# Patient Record
Sex: FEMALE | Race: BLACK OR AFRICAN AMERICAN | NOT HISPANIC OR LATINO | Employment: FULL TIME | URBAN - METROPOLITAN AREA
[De-identification: names, ages, dates, MRNs, and addresses within clinical notes are randomized per-mention and may not be internally consistent; named-entity substitution may affect disease eponyms.]

---

## 2017-04-28 ENCOUNTER — HOSPITAL ENCOUNTER (OUTPATIENT)
Dept: OTHER | Facility: HOSPITAL | Age: 52
Discharge: HOME | End: 2017-04-28

## 2018-10-26 ENCOUNTER — CONVERSION ENCOUNTER (OUTPATIENT)
Dept: INTERNAL MEDICINE | Facility: CLINIC | Age: 53
End: 2018-10-26

## 2018-10-26 ENCOUNTER — OFFICE VISIT CONVERTED (OUTPATIENT)
Dept: INTERNAL MEDICINE | Facility: CLINIC | Age: 53
End: 2018-10-26
Attending: INTERNAL MEDICINE

## 2018-11-20 ENCOUNTER — HOSPITAL ENCOUNTER (OUTPATIENT)
Dept: OTHER | Facility: HOSPITAL | Age: 53
Discharge: HOME | End: 2018-11-20

## 2018-12-27 ENCOUNTER — OFFICE VISIT CONVERTED (OUTPATIENT)
Dept: INTERNAL MEDICINE | Facility: CLINIC | Age: 53
End: 2018-12-27
Attending: INTERNAL MEDICINE

## 2019-11-01 ENCOUNTER — HOSPITAL ENCOUNTER (OUTPATIENT)
Dept: OTHER | Facility: HOSPITAL | Age: 54
Discharge: HOME | End: 2019-11-01
Attending: INTERNAL MEDICINE

## 2019-11-01 ENCOUNTER — OFFICE VISIT CONVERTED (OUTPATIENT)
Dept: INTERNAL MEDICINE | Facility: CLINIC | Age: 54
End: 2019-11-01
Attending: INTERNAL MEDICINE

## 2019-11-01 LAB
25(OH)D3 SERPL-MCNC: 11.3 NG/ML (ref 30–100)
ALBUMIN SERPL-MCNC: 4.6 G/DL (ref 3.5–5)
ALBUMIN/GLOB SERPL: 1.6 {RATIO} (ref 1.4–2.6)
ALP SERPL-CCNC: 131 U/L (ref 53–141)
ALT SERPL-CCNC: 11 U/L (ref 10–40)
ANION GAP SERPL CALC-SCNC: 16 MMOL/L (ref 8–19)
AST SERPL-CCNC: 20 U/L (ref 15–50)
BASOPHILS # BLD AUTO: 0.04 10*3/UL (ref 0–0.2)
BASOPHILS NFR BLD AUTO: 0.7 % (ref 0–3)
BILIRUB SERPL-MCNC: 0.31 MG/DL (ref 0.2–1.3)
BUN SERPL-MCNC: 16 MG/DL (ref 5–25)
BUN/CREAT SERPL: 18 {RATIO} (ref 6–20)
CALCIUM SERPL-MCNC: 10 MG/DL (ref 8.7–10.4)
CHLORIDE SERPL-SCNC: 103 MMOL/L (ref 99–111)
CHOLEST SERPL-MCNC: 150 MG/DL (ref 107–200)
CHOLEST/HDLC SERPL: 2 {RATIO} (ref 3–6)
CONV ABS IMM GRAN: 0.01 10*3/UL (ref 0–0.2)
CONV CO2: 27 MMOL/L (ref 22–32)
CONV IMMATURE GRAN: 0.2 % (ref 0–1.8)
CONV TOTAL PROTEIN: 7.5 G/DL (ref 6.3–8.2)
CREAT UR-MCNC: 0.88 MG/DL (ref 0.5–0.9)
DEPRECATED RDW RBC AUTO: 42.5 FL (ref 36.4–46.3)
EOSINOPHIL # BLD AUTO: 0.45 10*3/UL (ref 0–0.7)
EOSINOPHIL # BLD AUTO: 8.1 % (ref 0–7)
ERYTHROCYTE [DISTWIDTH] IN BLOOD BY AUTOMATED COUNT: 12.9 % (ref 11.7–14.4)
GFR SERPLBLD BASED ON 1.73 SQ M-ARVRAT: >60 ML/MIN/{1.73_M2}
GLOBULIN UR ELPH-MCNC: 2.9 G/DL (ref 2–3.5)
GLUCOSE SERPL-MCNC: 93 MG/DL (ref 65–99)
HCT VFR BLD AUTO: 41.1 % (ref 37–47)
HDLC SERPL-MCNC: 75 MG/DL (ref 40–60)
HGB BLD-MCNC: 13.1 G/DL (ref 12–16)
LDLC SERPL CALC-MCNC: 63 MG/DL (ref 70–100)
LYMPHOCYTES # BLD AUTO: 1.52 10*3/UL (ref 1–5)
LYMPHOCYTES NFR BLD AUTO: 27.4 % (ref 20–45)
MCH RBC QN AUTO: 28.9 PG (ref 27–31)
MCHC RBC AUTO-ENTMCNC: 31.9 G/DL (ref 33–37)
MCV RBC AUTO: 90.5 FL (ref 81–99)
MONOCYTES # BLD AUTO: 0.59 10*3/UL (ref 0.2–1.2)
MONOCYTES NFR BLD AUTO: 10.6 % (ref 3–10)
NEUTROPHILS # BLD AUTO: 2.93 10*3/UL (ref 2–8)
NEUTROPHILS NFR BLD AUTO: 53 % (ref 30–85)
NRBC CBCN: 0 % (ref 0–0.7)
OSMOLALITY SERPL CALC.SUM OF ELEC: 295 MOSM/KG (ref 273–304)
PLATELET # BLD AUTO: 272 10*3/UL (ref 130–400)
PMV BLD AUTO: 11.8 FL (ref 9.4–12.3)
POTASSIUM SERPL-SCNC: 4.1 MMOL/L (ref 3.5–5.3)
RBC # BLD AUTO: 4.54 10*6/UL (ref 4.2–5.4)
SODIUM SERPL-SCNC: 142 MMOL/L (ref 135–147)
TRIGL SERPL-MCNC: 62 MG/DL (ref 40–150)
TSH SERPL-ACNC: 1.41 M[IU]/L (ref 0.27–4.2)
VLDLC SERPL-MCNC: 12 MG/DL (ref 5–37)
WBC # BLD AUTO: 5.54 10*3/UL (ref 4.8–10.8)

## 2019-12-12 ENCOUNTER — HOSPITAL ENCOUNTER (OUTPATIENT)
Dept: OTHER | Facility: HOSPITAL | Age: 54
Discharge: HOME | End: 2019-12-12

## 2020-11-19 ENCOUNTER — OFFICE VISIT CONVERTED (OUTPATIENT)
Dept: INTERNAL MEDICINE | Facility: CLINIC | Age: 55
End: 2020-11-19
Attending: INTERNAL MEDICINE

## 2020-11-19 ENCOUNTER — HOSPITAL ENCOUNTER (OUTPATIENT)
Dept: OTHER | Facility: HOSPITAL | Age: 55
Discharge: HOME | End: 2020-11-19
Attending: INTERNAL MEDICINE

## 2020-12-03 ENCOUNTER — OFFICE VISIT CONVERTED (OUTPATIENT)
Dept: INTERNAL MEDICINE | Facility: CLINIC | Age: 55
End: 2020-12-03
Attending: INTERNAL MEDICINE

## 2021-05-13 NOTE — PROGRESS NOTES
Progress Note      Patient Name: Khoi Mackey   Patient ID: [id number]   Sex: Female   YOB: 1965    Primary Care Provider: Christiane Yeh MD    Visit Date: December 3, 2020    Provider: Christiane Yeh MD   Location: [facility]   Location Address: [address] [place] [address]  [place], [place]  [id number]   Location Phone: [phone]          Chief Complaint  · right shoulder pain       History Of Present Illness  Khoi Mackey is a 55 year old /Black female who presents for evaluation and treatment of:      Right shoulder pain, ongoing  Xray results reviewed, showing degenerative changes of glenohumeral and AC joints  Patient desires shoulder injection       Past Medical History  Disease Name Date Onset Notes   Allergic rhinitis --  --    Asthma --  --    Screening for breast cancer 12/2019 wnl         Past Surgical History  Procedure Name Date Notes   Partial Hysterectomy --  --    Pilonidal Cyst Excision --  --          Medication List  Name Date Started Instructions   fexofenadine 180 mg oral tablet  take 1 tablet (180 mg) by oral route once daily   Vitamin D2 50,000 unit oral capsule 11/07/2019 take 1 capsule (50,000 unit) by oral route once weekly         Allergy List  Allergen Name Date Reaction Notes   NO KNOWN DRUG ALLERGIES --  --  --        Allergies Reconciled  Social History  Finding Status Start/Stop Quantity Notes   Alcohol Current some day --/-- --  --    Tobacco Never --/-- --  --          Immunizations  NameDate Admin Mfg Trade Name Lot Number Route Inj VIS Given VIS Publication   Rymkirfyy17/19/2020 [facility] Fluzone Quadrivalent [id number] IM LD 11/19/2020 08/15/2019   Comments: pt tolerated well. Left office in stable condition.         Review of Systems  · Constitutional  o Denies  o : fever, fatigue, weight loss, weight gain  · Cardiovascular  o Denies  o : lower extremity edema, claudication, chest pressure,  "palpitations  · Respiratory  o Denies  o : shortness of breath, wheezing, frequent cough, hemoptysis, dyspnea on exertion  · Gastrointestinal  o Denies  o : nausea, vomiting, diarrhea, constipation, abdominal pain  · Musculoskeletal  o Admits  o : shoulder pain      Vitals  Date Time BP Position Site L\R Cuff Size HR RR TEMP (F) WT  HT  BMI kg/m2 BSA m2 O2 Sat FR L/min FiO2 HC       11/01/2019 10:51 /58 Sitting    63 - R 16 97.7 152lbs 8oz 5'  7\" 23.88 1.81 99 %  21%    11/19/2020 08:50 /68 Sitting    65 - R 15 96.8 160lbs 0oz 5'  7\" 25.06 1.85 98 %      12/03/2020 08:16 /64 Sitting    64 - R 15 97.6 161lbs 0oz 5'  7\" 25.22 1.86 100 %            Physical Examination  · Constitutional  o Appearance  o : no acute distress, well-nourished  · Head and Face  o Head  o :   § Inspection  § : atraumatic, normocephalic  · Eyes  o Eyes  o : extraocular movements intact, no scleral icterus, no conjunctival injection  · Ears, Nose, Mouth and Throat  o Ears  o :   § External Ears  § : normal  o Nose  o :   § Intranasal Exam  § : nares patent  o Oral Cavity  o :   § Oral Mucosa  § : moist mucous membranes  · Respiratory  o Respiratory Effort  o : breathing comfortably, symmetric chest rise  o Auscultation of Lungs  o : clear to asculatation bilaterally, no wheezes, rales, or rhonchii  · Cardiovascular  o Heart  o :   § Auscultation of Heart  § : regular rate and rhythm, no murmurs, rubs, or gallops  o Peripheral Vascular System  o :   § Extremities  § : no edema  · Neurologic  o Mental Status Examination  o :   § Orientation  § : grossly oriented to person, place and time  o Gait and Station  o :   § Gait Screening  § : normal gait  · Psychiatric  o General  o : normal mood and affect  · IMPShoulder Injection  o Shoulder  o : Right Shoulder   o Procedure info  o : Informed consent obtained. Patient was informed of possible adverse effects including but not limited to bleeding, damage to nerve, tendon or artery, " increased blood sugar, or increased blood pressure. They were instructed to call if they have any concerns or questions. Time out performed. Patient placed with shoulder passively hanging from side of body at 0 degrees. Lateral edge of scapular spine was palpated and site was marked just inferior to this, to proceed with injection per typical posterior approach. Betadine was swabbed at injection site per typical technique. 25 ga. 1.5 inch needle injected into bursa, directed slightly medially, aspiration was performed and then 80mg Kenalog and 4 mL of 1% Lidocaine without Epi was slowly injected into joint space, fluid was free flowing. Needle was removed, betadine wiped off and band-aid applied to injection site.           Assessment  · Right shoulder pain     719.41/M25.511  shoulder injection performed  patient tolerated well      Plan  · Orders  o Joint Injection; major joint or bursa (eg. shoulder, hip, knee, subacromial bursa) (60201) - 719.41/M25.511 - 12/03/2020  o ACO-39: Current medications updated and reviewed (, 1159F) - - 12/03/2020  o ACO-14: Influenza immunization administered or previously received [facility] () - - 12/03/2020  o 4.00 - Kenalog Injection 40mg (-5) - 719.41/M25.511 - 12/03/2020   Injection - Kenalog 40 mg; Dose: 1 ml; Site: Right Shoulder; Route: intra-articular; Date: 12/03/2020 08:53:08; Exp: 10/31/2021; Lot: [id number]; Mfg: [employer]; TradeName: triamcinolone acetonide; Location: [facility]; Administered By: Maris Angel MA; Comment: patient tolerated well, left office in stable condition  o 4.00 - Kenalog Injection 40mg (-6) - 719.41/M25.511 - 12/03/2020   Injection - Kenalog 40 mg; Dose: 1 ml; Site: Right Shoulder; Route: intra-articular; Date: 12/03/2020 08:54:09; Exp: 10/31/2021; Lot: [id number]; Mfg: TEVA PHARM; TradeName: triamcinolone acetonide; Location: [facility]; Administered By: Maris Angel MA;  Comment: patient tolerated well, left office in stable condition  · Medications  o Medications have been Reconciled  o Transition of Care or Provider Policy  · Instructions  o Patient was educated/instructed on their diagnosis, treatment and medications prior to discharge from the clinic today.  o Call the office with any concerns or questions.            Electronically Signed by: Christiane Yeh MD -Author on December 3, 2020 09:00:01 AM

## 2021-05-13 NOTE — PROGRESS NOTES
Progress Note      Patient Name: Khoi Mackey   Patient ID: [id number]   Sex: Female   YOB: 1965    Primary Care Provider: Christiane Yeh MD    Visit Date: November 19, 2020    Provider: Christiane Yeh MD   Location: [facility]   Location Address: [address] [place] [address] 3  [place], [place]  [id number]   Location Phone: [phone]          Chief Complaint  · right shoulder pain      History Of Present Illness  Khoi Mackey is a 55 year old /Black female who presents for evaluation and treatment of:      right shoulder pain x 11 months. pain in anterior shoulder, feels grinding sensation, worse with lifting arm to reach for things or bringing arm back behind her    Has been sleeping with arm elevated or on ice pack    wants flu shot today       Past Medical History  Disease Name Date Onset Notes   Allergic rhinitis --  --    Asthma --  --    Screening for breast cancer 12/2019 wnl         Past Surgical History  Procedure Name Date Notes   Partial Hysterectomy --  --    Pilonidal Cyst Excision --  --          Medication List  Name Date Started Instructions   fexofenadine 180 mg oral tablet  take 1 tablet (180 mg) by oral route once daily   naproxen 500 mg oral tablet  take 1 tablet (500 mg) by oral route 2 times per day with food   olopatadine 0.1 % ophthalmic (eye) drops  --    Vitamin D2 50,000 unit oral capsule 11/07/2019 take 1 capsule (50,000 unit) by oral route once weekly         Allergy List  Allergen Name Date Reaction Notes   NO KNOWN DRUG ALLERGIES --  --  --        Allergies Reconciled  Social History  Finding Status Start/Stop Quantity Notes   Alcohol Current some day --/-- --  --    Tobacco Never --/-- --  --          Immunizations  NameDate Admin Mfg Trade Name Lot Number Route Inj VIS Given VIS Publication   Tsqiwjqil77/19/2020 [facility] Fluzone Quadrivalent [id number] IM LD 11/19/2020 08/15/2019   Comments: pt tolerated well. Left office in stable  "condition.         Review of Systems  · Constitutional  o Denies  o : fever, fatigue, weight loss, weight gain  · Cardiovascular  o Denies  o : lower extremity edema, claudication, chest pressure, palpitations  · Respiratory  o Denies  o : shortness of breath, wheezing, frequent cough, hemoptysis, dyspnea on exertion  · Gastrointestinal  o Denies  o : nausea, vomiting, diarrhea, constipation, abdominal pain  · Musculoskeletal  o Admits  o : shoulder pain      Vitals  Date Time BP Position Site L\R Cuff Size HR RR TEMP (F) WT  HT  BMI kg/m2 BSA m2 O2 Sat FR L/min FiO2 HC       12/27/2018 08:13 /64 Sitting    55 - R 12 98.7 156lbs 16oz 5'  7\" 24.59 1.83 100 %      11/01/2019 10:51 /58 Sitting    63 - R 16 97.7 152lbs 8oz 5'  7\" 23.88 1.81 99 %  21%    11/19/2020 08:50 /68 Sitting    65 - R 15 96.8 160lbs 0oz 5'  7\" 25.06 1.85 98 %            Physical Examination  · Constitutional  o Appearance  o : no acute distress, well-nourished  · Head and Face  o Head  o :   § Inspection  § : atraumatic, normocephalic  · Eyes  o Eyes  o : extraocular movements intact, no scleral icterus, no conjunctival injection  · Ears, Nose, Mouth and Throat  o Ears  o :   § External Ears  § : normal  o Nose  o :   § Intranasal Exam  § : nares patent  o Oral Cavity  o :   § Oral Mucosa  § : moist mucous membranes  · Respiratory  o Respiratory Effort  o : breathing comfortably, symmetric chest rise  o Auscultation of Lungs  o : clear to asculatation bilaterally, no wheezes, rales, or rhonchii  · Cardiovascular  o Heart  o :   § Auscultation of Heart  § : regular rate and rhythm, no murmurs, rubs, or gallops  o Peripheral Vascular System  o :   § Extremities  § : no edema  · Neurologic  o Mental Status Examination  o :   § Orientation  § : grossly oriented to person, place and time  o Gait and Station  o :   § Gait Screening  § : normal gait  · Psychiatric  o General  o : normal mood and affect          Assessment  · Need for " influenza vaccination     V04.81/Z23  · Right shoulder pain     719.41/M25.511  will obtain xray of shoulder      Plan  · Orders  o Immunization Admin Fee (Single) ([facility]) (37331) - V04.81/Z23 - 11/19/2020  o Fluzone Quadrivalent Vaccine, age 6 months + (32516) - V04.81/Z23 - 11/19/2020   Vaccine - Influenza; Dose: 0.5; Site: Left Deltoid; Route: Intramuscular; Date: 11/19/2020 09:08:00; Exp: 06/30/2020; Lot: [id number]; Mfg: sanofi pasteur; TradeName: Fluzone Quadrivalent; Administered By: Lizz Ashford LPN; Comment: pt tolerated well. Left office in stable condition.  o ACO-39: Current medications updated and reviewed (, 1159F) - - 11/19/2020  o Xray shoulder right [facility] Preferred View (60392-RM) - 719.41/M25.511 - 11/19/2020  · Medications  o Medications have been Reconciled  o Transition of Care or Provider Policy  · Instructions  o Patient was educated/instructed on their diagnosis, treatment and medications prior to discharge from the clinic today.  o Call the office with any concerns or questions.            Electronically Signed by: Christiane Yeh MD -Author on November 19, 2020 10:09:18 AM

## 2021-05-14 VITALS
HEIGHT: 67 IN | HEART RATE: 64 BPM | BODY MASS INDEX: 25.27 KG/M2 | WEIGHT: 161 LBS | DIASTOLIC BLOOD PRESSURE: 64 MMHG | TEMPERATURE: 97.6 F | OXYGEN SATURATION: 100 % | RESPIRATION RATE: 15 BRPM | SYSTOLIC BLOOD PRESSURE: 100 MMHG

## 2021-05-14 VITALS
SYSTOLIC BLOOD PRESSURE: 110 MMHG | OXYGEN SATURATION: 98 % | BODY MASS INDEX: 25.11 KG/M2 | HEIGHT: 67 IN | WEIGHT: 160 LBS | DIASTOLIC BLOOD PRESSURE: 68 MMHG | RESPIRATION RATE: 15 BRPM | HEART RATE: 65 BPM | TEMPERATURE: 96.8 F

## 2021-05-15 VITALS
OXYGEN SATURATION: 100 % | WEIGHT: 157 LBS | DIASTOLIC BLOOD PRESSURE: 64 MMHG | HEIGHT: 67 IN | TEMPERATURE: 98.7 F | BODY MASS INDEX: 24.64 KG/M2 | RESPIRATION RATE: 12 BRPM | SYSTOLIC BLOOD PRESSURE: 118 MMHG | HEART RATE: 56 BPM

## 2021-05-15 VITALS
TEMPERATURE: 97.7 F | WEIGHT: 152.5 LBS | HEIGHT: 67 IN | RESPIRATION RATE: 16 BRPM | HEART RATE: 63 BPM | BODY MASS INDEX: 23.93 KG/M2 | DIASTOLIC BLOOD PRESSURE: 58 MMHG | OXYGEN SATURATION: 99 % | SYSTOLIC BLOOD PRESSURE: 110 MMHG

## 2021-05-16 VITALS
HEIGHT: 67 IN | BODY MASS INDEX: 22.13 KG/M2 | OXYGEN SATURATION: 100 % | WEIGHT: 141 LBS | TEMPERATURE: 97.6 F | SYSTOLIC BLOOD PRESSURE: 118 MMHG | HEART RATE: 76 BPM | RESPIRATION RATE: 12 BRPM | DIASTOLIC BLOOD PRESSURE: 68 MMHG

## 2021-07-02 ENCOUNTER — OFFICE VISIT (OUTPATIENT)
Dept: INTERNAL MEDICINE | Facility: CLINIC | Age: 56
End: 2021-07-02

## 2021-07-02 VITALS
HEART RATE: 55 BPM | HEIGHT: 67 IN | TEMPERATURE: 97.3 F | SYSTOLIC BLOOD PRESSURE: 124 MMHG | BODY MASS INDEX: 25.13 KG/M2 | DIASTOLIC BLOOD PRESSURE: 68 MMHG | WEIGHT: 160.13 LBS | OXYGEN SATURATION: 97 %

## 2021-07-02 DIAGNOSIS — M19.019 PRIMARY OSTEOARTHRITIS OF SHOULDER, UNSPECIFIED LATERALITY: Primary | ICD-10-CM

## 2021-07-02 PROBLEM — J30.9 ALLERGIC RHINITIS: Status: ACTIVE | Noted: 2021-07-02

## 2021-07-02 PROBLEM — J45.909 ASTHMA: Status: ACTIVE | Noted: 2021-07-02

## 2021-07-02 PROCEDURE — 20610 DRAIN/INJ JOINT/BURSA W/O US: CPT | Performed by: INTERNAL MEDICINE

## 2021-07-02 RX ORDER — FEXOFENADINE HCL 180 MG/1
180 TABLET ORAL DAILY
COMMUNITY

## 2021-07-02 RX ADMIN — LIDOCAINE HYDROCHLORIDE 5 ML: 10 INJECTION, SOLUTION INFILTRATION; PERINEURAL at 11:17

## 2021-07-02 RX ADMIN — TRIAMCINOLONE ACETONIDE 80 MG: 40 INJECTION, SUSPENSION INTRA-ARTICULAR; INTRAMUSCULAR at 11:17

## 2021-09-08 PROBLEM — M19.019 OSTEOARTHRITIS OF SHOULDER: Status: ACTIVE | Noted: 2021-09-08

## 2021-09-08 RX ORDER — LIDOCAINE HYDROCHLORIDE 10 MG/ML
5 INJECTION, SOLUTION INFILTRATION; PERINEURAL
Status: COMPLETED | OUTPATIENT
Start: 2021-07-02 | End: 2021-07-02

## 2021-09-08 RX ORDER — TRIAMCINOLONE ACETONIDE 40 MG/ML
80 INJECTION, SUSPENSION INTRA-ARTICULAR; INTRAMUSCULAR
Status: COMPLETED | OUTPATIENT
Start: 2021-07-02 | End: 2021-07-02

## 2022-08-07 ENCOUNTER — HOSPITAL ENCOUNTER (EMERGENCY)
Facility: HOSPITAL | Age: 57
Discharge: HOME | End: 2022-08-07
Attending: EMERGENCY MEDICINE | Admitting: EMERGENCY MEDICINE

## 2022-08-07 ENCOUNTER — APPOINTMENT (OUTPATIENT)
Dept: GENERAL RADIOLOGY | Facility: HOSPITAL | Age: 57
End: 2022-08-07

## 2022-08-07 VITALS
HEIGHT: 64 IN | SYSTOLIC BLOOD PRESSURE: 113 MMHG | DIASTOLIC BLOOD PRESSURE: 66 MMHG | OXYGEN SATURATION: 97 % | WEIGHT: 160.05 LBS | BODY MASS INDEX: 27.33 KG/M2 | HEART RATE: 60 BPM | RESPIRATION RATE: 16 BRPM | TEMPERATURE: 98 F

## 2022-08-07 DIAGNOSIS — S80.10XA CONTUSION OF LOWER LEG, UNSPECIFIED LATERALITY, INITIAL ENCOUNTER: Primary | ICD-10-CM

## 2022-08-07 PROCEDURE — 73630 X-RAY EXAM OF FOOT: CPT

## 2022-08-07 PROCEDURE — 99283 EMERGENCY DEPT VISIT LOW MDM: CPT

## 2022-08-07 PROCEDURE — 73590 X-RAY EXAM OF LOWER LEG: CPT

## 2022-08-07 RX ORDER — IBUPROFEN 800 MG/1
800 TABLET ORAL EVERY 6 HOURS PRN
Qty: 20 TABLET | Refills: 0 | Status: SHIPPED | OUTPATIENT
Start: 2022-08-07

## 2022-08-07 RX ORDER — OXYCODONE HYDROCHLORIDE AND ACETAMINOPHEN 5; 325 MG/1; MG/1
1 TABLET ORAL EVERY 6 HOURS PRN
Qty: 12 TABLET | Refills: 0 | Status: SHIPPED | OUTPATIENT
Start: 2022-08-07

## 2022-08-07 RX ORDER — OXYCODONE HYDROCHLORIDE AND ACETAMINOPHEN 5; 325 MG/1; MG/1
1 TABLET ORAL ONCE
Status: COMPLETED | OUTPATIENT
Start: 2022-08-07 | End: 2022-08-07

## 2022-08-07 RX ADMIN — OXYCODONE HYDROCHLORIDE AND ACETAMINOPHEN 1 TABLET: 5; 325 TABLET ORAL at 08:10

## 2022-08-07 NOTE — ED PROVIDER NOTES
Time: 7:40 AM EDT  Arrived by: private car  Chief Complaint: foot pain  History provided by: patient  History is limited by: N/A     History of Present Illness:  Patient is a 57 y.o. year old female who presents to the emergency department with complaints of right foot pain x 2 days ago. Pt was moving a table and dropped it onto her foot. She is able to bear weight on that foot.       History provided by:  Patient   used: No    Foot Pain  Location:  Right foot  Quality:  Tight  Severity:  Moderate  Duration:  2 days  Progression:  Unchanged  Context:  Dropped table on foot  Associated symptoms: no abdominal pain, no chest pain, no congestion, no cough, no diarrhea, no fever, no headaches, no myalgias, no nausea, no rhinorrhea, no shortness of breath, no sore throat and no vomiting        Similar Symptoms Previously: no  Recently seen: no      Patient Care Team  Primary Care Provider: Christiane Yeh MD    Past Medical History:     No Known Allergies  Past Medical History:   Diagnosis Date   • Allergic rhinitis    • Asthma      Past Surgical History:   Procedure Laterality Date   • HYSTERECTOMY      partial   • PILONIDAL CYST / SINUS EXCISION       History reviewed. No pertinent family history.    Home Medications:  Prior to Admission medications    Medication Sig Start Date End Date Taking? Authorizing Provider   fexofenadine (ALLEGRA) 180 MG tablet Take 180 mg by mouth Daily.    Provider, MD Kaycee        Social History:   Social History     Tobacco Use   • Smoking status: Never Smoker   • Smokeless tobacco: Never Used   Vaping Use   • Vaping Use: Never used   Substance Use Topics   • Alcohol use: Yes   • Drug use: Never     Recent travel: no     Review of Systems:  Review of Systems   Constitutional: Negative for chills and fever.   HENT: Negative for congestion, rhinorrhea and sore throat.    Eyes: Negative for pain and visual disturbance.   Respiratory: Negative for apnea,  "cough, chest tightness and shortness of breath.    Cardiovascular: Negative for chest pain and palpitations.   Gastrointestinal: Negative for abdominal pain, diarrhea, nausea and vomiting.   Genitourinary: Negative for difficulty urinating and dysuria.   Musculoskeletal: Negative for joint swelling and myalgias.        Right foot pain   Skin: Negative for color change.   Neurological: Negative for seizures and headaches.   Psychiatric/Behavioral: Negative.    All other systems reviewed and are negative.       Physical Exam:  /66 (BP Location: Left arm, Patient Position: Sitting)   Pulse 60   Temp 98 °F (36.7 °C) (Oral)   Resp 16   Ht 162.6 cm (64\")   Wt 72.6 kg (160 lb 0.9 oz)   SpO2 97%   BMI 27.47 kg/m²     Physical Exam  Vitals and nursing note reviewed.   Constitutional:       General: She is not in acute distress.     Appearance: Normal appearance. She is not toxic-appearing.   HENT:      Head: Normocephalic and atraumatic.      Jaw: There is normal jaw occlusion.   Eyes:      General: Lids are normal.      Extraocular Movements: Extraocular movements intact.      Conjunctiva/sclera: Conjunctivae normal.      Pupils: Pupils are equal, round, and reactive to light.   Cardiovascular:      Rate and Rhythm: Normal rate and regular rhythm.      Pulses: Normal pulses.      Heart sounds: Normal heart sounds.   Pulmonary:      Effort: Pulmonary effort is normal. No respiratory distress.      Breath sounds: Normal breath sounds. No wheezing or rhonchi.   Abdominal:      General: Abdomen is flat.      Palpations: Abdomen is soft.      Tenderness: There is no abdominal tenderness. There is no guarding or rebound.   Musculoskeletal:         General: Normal range of motion.      Cervical back: Normal range of motion and neck supple.      Right lower leg: No edema.      Left lower leg: No edema.      Right ankle: Swelling (dorsum aspect) present. Tenderness (Anterior and dorsum aspect) present.      Right foot: " Decreased capillary refill (< 3).   Skin:     General: Skin is warm and dry.   Neurological:      Mental Status: She is alert and oriented to person, place, and time. Mental status is at baseline.      Sensory: No sensory deficit.      Comments: Neurovascularly intact   Psychiatric:         Mood and Affect: Mood normal.                Medications in the Emergency Department:  Medications   oxyCODONE-acetaminophen (PERCOCET) 5-325 MG per tablet 1 tablet (1 tablet Oral Given 8/7/22 0810)        Labs  Lab Results (last 24 hours)     ** No results found for the last 24 hours. **           Imaging:  XR Tibia Fibula 2 View Right    Result Date: 8/7/2022  PROCEDURE: XR TIBIA FIBULA 2 VW RIGHT  COMPARISON: None  INDICATIONS: RIGHT LOWER LEG PAIN/INJURY  FINDINGS: There is no evidence for displaced fracture or dislocation. No focal osseous abnormalities are seen. The soft tissues are unremarkable.       No evidence for displaced fracture or dislocation.      ASAD HERRERA MD       Electronically Signed and Approved By: ASAD HERRERA MD on 8/07/2022 at 8:36             XR Foot 3+ View Right    Result Date: 8/7/2022  PROCEDURE: XR FOOT 3+ VW RIGHT  COMPARISON: None  INDICATIONS: RIGHT FOOT PAIN  FINDINGS: There is no evidence for displaced fracture or dislocation. No focal osseous abnormalities are seen.  Soft tissue swelling is seen at the dorsal aspect of the midfoot.       No evidence for displaced fracture or dislocation.      ASAD HERRERA MD       Electronically Signed and Approved By: ASAD HERRERA MD on 8/07/2022 at 7:55               Procedures:  Procedures    Progress                            Medical Decision Making:  MDM  Number of Diagnoses or Management Options  Contusion of lower leg, unspecified laterality, initial encounter  Diagnosis management comments: X-ray of the tib-fib is negative for fracture.  X-ray of the foot is negative for fracture.  Patient was placed in a walking boot.  Patient refused the  crutches.  Patient advised to follow-up with her primary care doctor next 2 to 3 days.       Amount and/or Complexity of Data Reviewed  Tests in the radiology section of CPT®: reviewed  Independent visualization of images, tracings, or specimens: yes    Risk of Complications, Morbidity, and/or Mortality  Presenting problems: moderate  Management options: moderate    Patient Progress  Patient progress: resolved (08:49 EDT Updated patient on results: No fractures found on XR. Plan to put patient in a walking boot. Pt is refusing crutches at this time. Explained to patient to abstain from strenuous activity that might apply stress to the foot. Patient expressed understanding and agreement. All questions answered at this time. )       Final diagnoses:   Contusion of lower leg, unspecified laterality, initial encounter        Disposition:  ED Disposition     ED Disposition   Discharge    Condition   Stable    Comment   --             This medical record created using voice recognition software.        Documentation assistance provided by ANNA C CANTU acting as scribe for Hima Martinez MD. Information recorded by the scribe was done at my direction and has been verified and validated by me.          Cantu, Anna C  08/07/22 0757       Cantu, Anna C  08/07/22 0851       Cantu, Anna C  08/07/22 0910       Hima Martinez MD  08/07/22 0911

## 2025-06-04 ENCOUNTER — TRANSCRIBE ORDERS (OUTPATIENT)
Dept: ADMINISTRATIVE | Facility: HOSPITAL | Age: 60
End: 2025-06-04
Payer: OTHER GOVERNMENT

## 2025-06-04 DIAGNOSIS — Z12.31 OTHER SCREENING MAMMOGRAM: Primary | ICD-10-CM

## 2025-06-23 ENCOUNTER — HOSPITAL ENCOUNTER (OUTPATIENT)
Dept: MAMMOGRAPHY | Facility: HOSPITAL | Age: 60
Discharge: HOME | End: 2025-06-23
Admitting: NURSE PRACTITIONER
Payer: OTHER GOVERNMENT

## 2025-06-23 DIAGNOSIS — Z12.31 OTHER SCREENING MAMMOGRAM: ICD-10-CM

## 2025-06-23 PROCEDURE — 77063 BREAST TOMOSYNTHESIS BI: CPT

## 2025-06-23 PROCEDURE — 77067 SCR MAMMO BI INCL CAD: CPT
